# Patient Record
Sex: MALE
[De-identification: names, ages, dates, MRNs, and addresses within clinical notes are randomized per-mention and may not be internally consistent; named-entity substitution may affect disease eponyms.]

---

## 2020-01-06 ENCOUNTER — TRANSCRIPTION ENCOUNTER (OUTPATIENT)
Age: 25
End: 2020-01-06

## 2021-08-08 ENCOUNTER — TRANSCRIPTION ENCOUNTER (OUTPATIENT)
Age: 26
End: 2021-08-08

## 2022-07-28 ENCOUNTER — APPOINTMENT (OUTPATIENT)
Dept: ORTHOPEDIC SURGERY | Facility: CLINIC | Age: 27
End: 2022-07-28

## 2022-07-28 VITALS — BODY MASS INDEX: 26.66 KG/M2 | HEIGHT: 65 IN | WEIGHT: 160 LBS

## 2022-07-28 DIAGNOSIS — Z78.9 OTHER SPECIFIED HEALTH STATUS: ICD-10-CM

## 2022-07-28 DIAGNOSIS — S80.11XA CONTUSION OF RIGHT LOWER LEG, INITIAL ENCOUNTER: ICD-10-CM

## 2022-07-28 PROBLEM — Z00.00 ENCOUNTER FOR PREVENTIVE HEALTH EXAMINATION: Status: ACTIVE | Noted: 2022-07-28

## 2022-07-28 PROCEDURE — 73590 X-RAY EXAM OF LOWER LEG: CPT | Mod: RT

## 2022-07-28 PROCEDURE — 99204 OFFICE O/P NEW MOD 45 MIN: CPT

## 2022-07-28 RX ORDER — NAPROXEN 500 MG/1
500 TABLET ORAL TWICE DAILY
Qty: 30 | Refills: 1 | Status: ACTIVE | COMMUNITY
Start: 2022-07-28 | End: 1900-01-01

## 2022-07-28 NOTE — HISTORY OF PRESENT ILLNESS
[Sudden] : sudden [4] : 4 [0] : 0 [Dull/Aching] : dull/aching [Stabbing] : stabbing [Rest] : rest [Ice] : ice [Heat] : heat [de-identified] : 07/28/2022 Mr. BONG SALVADOR,  27 year old  male , presents today for right lower leg. Reports that two weeks ago he fell and stuck his leg against a potted plant. Reports that he initially when to [facility] and was told no fracture. Has remaining bruising and healing abrasion over the anterior lower leg. Denies f/c/s.  [] : no [FreeTextEntry1] : rt leg [FreeTextEntry5] :  BONG SALVADOR is a 27 year male who is here today for rt lower leg pain. he stated he fell 2 weeks ago in a friend's backyard. The leg is bruised. He went to  after the fall and had a doppler done to rule out blood clots.   [de-identified] : activity [de-identified] : xray

## 2022-07-28 NOTE — DISCUSSION/SUMMARY
[de-identified] : 27m s/p contusion right lower leg with abrasion\par 1) Naproxen rx - gi precautions reviewed\par 2) cryotherapy, rest and activity modification\par 3) supportive shoes\par 4) rtc prn, f/up in 4 weeks if no improvement. \par \par Entered by Nida Weldon acting as scribe.\par

## 2022-07-28 NOTE — PHYSICAL EXAM
[Left] : left foot and ankle [5___] : plantar flexion 5[unfilled]/5 [] : no difficulty with single heel rise [FreeTextEntry8] : ttp midshaft tibia

## 2022-07-29 PROBLEM — Z78.9 NON-SMOKER: Status: ACTIVE | Noted: 2022-07-28

## 2022-07-29 PROBLEM — Z78.9 SOCIAL ALCOHOL USE: Status: ACTIVE | Noted: 2022-07-28
